# Patient Record
Sex: FEMALE | URBAN - METROPOLITAN AREA
[De-identification: names, ages, dates, MRNs, and addresses within clinical notes are randomized per-mention and may not be internally consistent; named-entity substitution may affect disease eponyms.]

---

## 2017-02-09 ENCOUNTER — APPOINTMENT (RX ONLY)
Dept: URBAN - METROPOLITAN AREA CLINIC 47 | Facility: CLINIC | Age: 47
Setting detail: DERMATOLOGY
End: 2017-02-09

## 2017-02-09 DIAGNOSIS — L81.1 CHLOASMA: ICD-10-CM

## 2017-02-09 DIAGNOSIS — L70.0 ACNE VULGARIS: ICD-10-CM | Status: INADEQUATELY CONTROLLED

## 2017-02-09 PROCEDURE — ? PRESCRIPTION

## 2017-02-09 PROCEDURE — 99202 OFFICE O/P NEW SF 15 MIN: CPT

## 2017-02-09 PROCEDURE — ? COUNSELING

## 2017-02-09 PROCEDURE — ? TREATMENT REGIMEN

## 2017-02-09 RX ORDER — HYDROQUINONE 4 %
CREAM (GRAM) TOPICAL
Qty: 1 | Refills: 3 | Status: ERX | COMMUNITY
Start: 2017-02-09

## 2017-02-09 RX ORDER — DOXYCYCLINE 100 MG/1
CAPSULE ORAL
Qty: 30 | Refills: 1 | Status: ERX | COMMUNITY
Start: 2017-02-09

## 2017-02-09 RX ORDER — TRETINOIN 0.1 %
CREAM (GRAM) TOPICAL
Qty: 1 | Refills: 1 | Status: ERX | COMMUNITY
Start: 2017-02-09

## 2017-02-09 RX ORDER — CLINDAMYCIN PHOSPHATE 1 %
SWAB, MEDICATED TOPICAL
Qty: 1 | Refills: 4 | Status: ERX | COMMUNITY
Start: 2017-02-09

## 2017-02-09 RX ADMIN — DOXYCYCLINE: 100 CAPSULE ORAL at 20:30

## 2017-02-09 RX ADMIN — Medication: at 20:15

## 2017-02-09 RX ADMIN — Medication: at 20:30

## 2017-02-09 ASSESSMENT — LOCATION SIMPLE DESCRIPTION DERM
LOCATION SIMPLE: RIGHT CHEEK
LOCATION SIMPLE: LEFT CHEEK
LOCATION SIMPLE: NECK
LOCATION SIMPLE: FRONTAL SCALP

## 2017-02-09 ASSESSMENT — LOCATION DETAILED DESCRIPTION DERM
LOCATION DETAILED: LEFT CENTRAL MALAR CHEEK
LOCATION DETAILED: MEDIAL FRONTAL SCALP
LOCATION DETAILED: LEFT INFERIOR CENTRAL MALAR CHEEK
LOCATION DETAILED: LEFT CENTRAL LATERAL NECK
LOCATION DETAILED: RIGHT INFERIOR CENTRAL MALAR CHEEK
LOCATION DETAILED: RIGHT MEDIAL MALAR CHEEK
LOCATION DETAILED: RIGHT CENTRAL LATERAL NECK

## 2017-02-09 ASSESSMENT — LOCATION ZONE DERM
LOCATION ZONE: NECK
LOCATION ZONE: FACE
LOCATION ZONE: SCALP

## 2017-02-09 NOTE — PROCEDURE: TREATMENT REGIMEN
Initiate Treatment: Clindamycin swab QAM, spot treat in pm\\nDoxycycline 100mg\\nRetin-A 0.1% cream
Detail Level: Zone
Otc Regimen: Epionce Lytic wash QAM \\nBenzoyl peroxide wash QHS\\nCeraVe lotion